# Patient Record
Sex: MALE | Race: OTHER | Employment: PART TIME | URBAN - METROPOLITAN AREA
[De-identification: names, ages, dates, MRNs, and addresses within clinical notes are randomized per-mention and may not be internally consistent; named-entity substitution may affect disease eponyms.]

---

## 2021-03-06 ENCOUNTER — APPOINTMENT (OUTPATIENT)
Dept: GENERAL RADIOLOGY | Age: 21
End: 2021-03-06
Payer: COMMERCIAL

## 2021-03-06 ENCOUNTER — HOSPITAL ENCOUNTER (EMERGENCY)
Age: 21
Discharge: HOME | End: 2021-03-06
Attending: EMERGENCY MEDICINE
Payer: COMMERCIAL

## 2021-03-06 VITALS
WEIGHT: 165 LBS | RESPIRATION RATE: 16 BRPM | OXYGEN SATURATION: 98 % | HEART RATE: 70 BPM | HEIGHT: 70 IN | SYSTOLIC BLOOD PRESSURE: 119 MMHG | TEMPERATURE: 98.1 F | DIASTOLIC BLOOD PRESSURE: 70 MMHG | BODY MASS INDEX: 23.62 KG/M2

## 2021-03-06 DIAGNOSIS — S63.005A DISLOCATION OF LEFT WRIST, INITIAL ENCOUNTER: Primary | ICD-10-CM

## 2021-03-06 PROCEDURE — 96372 THER/PROPH/DIAG INJ SC/IM: CPT

## 2021-03-06 PROCEDURE — 25675 CLTX DSTL RAD/ULN DISLC MNPJ: CPT

## 2021-03-06 PROCEDURE — 99283 EMERGENCY DEPT VISIT LOW MDM: CPT

## 2021-03-06 PROCEDURE — 73110 X-RAY EXAM OF WRIST: CPT

## 2021-03-06 PROCEDURE — 6370000000 HC RX 637 (ALT 250 FOR IP): Performed by: EMERGENCY MEDICINE

## 2021-03-06 PROCEDURE — 73100 X-RAY EXAM OF WRIST: CPT

## 2021-03-06 PROCEDURE — 6360000002 HC RX W HCPCS: Performed by: EMERGENCY MEDICINE

## 2021-03-06 RX ORDER — IBUPROFEN 800 MG/1
800 TABLET ORAL ONCE
Status: DISCONTINUED | OUTPATIENT
Start: 2021-03-06 | End: 2021-03-06

## 2021-03-06 RX ORDER — MELOXICAM 15 MG/1
15 TABLET ORAL DAILY PRN
Qty: 90 TABLET | Refills: 1 | Status: SHIPPED | OUTPATIENT
Start: 2021-03-06

## 2021-03-06 RX ORDER — KETOROLAC TROMETHAMINE 30 MG/ML
30 INJECTION, SOLUTION INTRAMUSCULAR; INTRAVENOUS ONCE
Status: COMPLETED | OUTPATIENT
Start: 2021-03-06 | End: 2021-03-06

## 2021-03-06 RX ORDER — OXYCODONE HYDROCHLORIDE AND ACETAMINOPHEN 5; 325 MG/1; MG/1
1 TABLET ORAL ONCE
Status: COMPLETED | OUTPATIENT
Start: 2021-03-06 | End: 2021-03-06

## 2021-03-06 RX ADMIN — OXYCODONE HYDROCHLORIDE AND ACETAMINOPHEN 1 TABLET: 5; 325 TABLET ORAL at 08:36

## 2021-03-06 RX ADMIN — KETOROLAC TROMETHAMINE 30 MG: 30 INJECTION, SOLUTION INTRAMUSCULAR; INTRAVENOUS at 08:37

## 2021-03-06 SDOH — HEALTH STABILITY: MENTAL HEALTH: HOW OFTEN DO YOU HAVE A DRINK CONTAINING ALCOHOL?: NEVER

## 2021-03-06 ASSESSMENT — PAIN SCALES - GENERAL
PAINLEVEL_OUTOF10: 7
PAINLEVEL_OUTOF10: 7

## 2021-03-06 ASSESSMENT — ENCOUNTER SYMPTOMS
VOMITING: 0
ABDOMINAL PAIN: 0
COUGH: 0
BACK PAIN: 0
NAUSEA: 0
DIARRHEA: 0
SHORTNESS OF BREATH: 0
SORE THROAT: 0

## 2021-03-06 ASSESSMENT — PAIN DESCRIPTION - PAIN TYPE: TYPE: ACUTE PAIN

## 2021-03-06 ASSESSMENT — PAIN DESCRIPTION - ORIENTATION: ORIENTATION: LEFT

## 2021-03-06 NOTE — ED TRIAGE NOTES
Arrived to the ER for Left wrist pain. States wrist began hurting approx 2 weeks ago. No direct injury that can remember. Today pain in wrist increased in intensity and felt tender to touch with swelling. Full ROM of wrist and fingers. Denies numbness/ tingling, +cap refill.

## 2021-03-06 NOTE — ED PROVIDER NOTES
[address] [facility] ED  eMERGENCYdEPARTMENT eNCOUnter      Pt Name: Jarrett Mendoza  MRN: [id number]  Emeliagfpedro 2000  Date of evaluation: 3/6/2021  Russell Mitchell MD    CHIEF COMPLAINT           HPI  Jarrett Mendoza is a 24 y.o. male per chart review has no pmh presents to the ED with L wrist pain. Pt notes gradual onset, moderate, constant, L wrist pain x 2 weeks. Pt is L handed. Pt works at [employer] and unsure if he hurt it at work. Pt denies fever, n/v, cp, sob, dysuria, diarrhea. ROS  Review of Systems   Constitutional: Negative for activity change, chills and fever. HENT: Negative for ear pain and sore throat. Eyes: Negative for visual disturbance. Respiratory: Negative for cough and shortness of breath. Cardiovascular: Negative for chest pain, palpitations and leg swelling. Gastrointestinal: Negative for abdominal pain, diarrhea, nausea and vomiting. Genitourinary: Negative for dysuria. Musculoskeletal: Negative for back pain. L wrist pain   Skin: Negative for rash. Neurological: Negative for dizziness and weakness. Except as noted above the remainder of the review of systems was reviewed and negative. PAST MEDICAL HISTORY   History reviewed. No pertinent past medical history. SURGICAL HISTORY     History reviewed. No pertinent surgical history. CURRENTMEDICATIONS       Previous Medications    No medications on file       ALLERGIES     Patient has no known allergies. FAMILY HISTORY     History reviewed. No pertinent family history.        SOCIAL HISTORY       Social History     Socioeconomic History    Marital status: Single     Spouse name: None    Number of children: None    Years of education: None    Highest education level: None   Occupational History    None   Social Needs    Financial resource strain: None    Food insecurity     Worry: None     Inability: None    Transportation needs     Medical: None     Non-medical: None dry.   Neurological:      Mental Status: He is alert and oriented to person, place, and time. Psychiatric:         Mood and Affect: Mood normal.           MDM  23 yo male presents to the ED with L wrist pain. Pt is afebrile, hemodynamically stable. Pt given PO percocet, IM toradol in the ED. XR of wrist shows ulnar dislocation at the wrist with lateral displacement. 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 Pt also lifts a lot of boxes at work. Pt's wrist was reduced in the ED. Post reduction wrist XR normal.  Pt placed in wrist splint. Pt given prescription for mobic, given wrist pain warning signs and will f/u with ortho. Pt understands plan. Conscious Sedation Procedure Note    Indication: L wrist dislocation    Consent: I have discussed with the patient and/or the patient representative the indication, alternatives, and the possible risks and/or complications of the planned procedure and the anesthesia methods. The patient and/or patient representative appear to understand and agree to proceed. Physician Involvement: The attending physician was present and supervising this procedure. Pre-Sedation Documentation and Exam: I have personally completed a history, physical exam & review of systems for this patient (see notes). Airway Assessment: normal    Prior History of Anesthesia Complications: none    ASA Classification: Class 1 - A normal healthy patient    Sedation/ Anesthesia Plan: PO percocet, IM toradol    Medications Used: PO percocet, IM toradol    Monitoring and Safety: The patient was placed on a cardiac monitor and vital signs, pulse oximetry and level of consciousness were continuously evaluated throughout the procedure. The patient was closely monitored until recovery from the medications was complete and the patient had returned to baseline status.  Respiratory therapy was on standby at all times during the procedure. (The following sections must be completed)  Post-Sedation Vital Signs: Vital signs were reviewed and were stable after the procedure (see flow sheet for vitals)            Post-Sedation Exam: Lungs: clear to auscultation bilaterally without crackles or wheezing and Cardiovascular: regular rate and rhythm, no murmurs rubs or gallops           Complications: none              FINAL IMPRESSION      1.  Dislocation of left wrist, initial encounter          DISPOSITION/PLAN   DISPOSITION Decision To Discharge 03/06/2021 09:34:10 AM        DISCHARGE MEDICATIONS:  [unfilled]         Angela Zapata MD(electronically signed)  Attending Emergency Physician            Angela Zapata MD  03/06/21 [address] [facility] MD Grace  03/06/21 2027

## 2021-03-11 ENCOUNTER — OFFICE VISIT (OUTPATIENT)
Dept: ORTHOPEDIC SURGERY | Age: 21
End: 2021-03-11
Payer: COMMERCIAL

## 2021-03-11 ENCOUNTER — HOSPITAL ENCOUNTER (OUTPATIENT)
Dept: ORTHOPEDIC SURGERY | Age: 21
Discharge: HOME | End: 2021-03-13
Payer: COMMERCIAL

## 2021-03-11 VITALS
WEIGHT: 165 LBS | OXYGEN SATURATION: 97 % | BODY MASS INDEX: 23.1 KG/M2 | HEIGHT: 71 IN | HEART RATE: 96 BPM | TEMPERATURE: 97.2 F

## 2021-03-11 DIAGNOSIS — M25.9 WRIST CLICKING: Primary | ICD-10-CM

## 2021-03-11 DIAGNOSIS — M25.9 WRIST CLICKING: ICD-10-CM

## 2021-03-11 DIAGNOSIS — M25.532 WRIST PAIN, CHRONIC, LEFT: ICD-10-CM

## 2021-03-11 DIAGNOSIS — G89.29 WRIST PAIN, CHRONIC, LEFT: ICD-10-CM

## 2021-03-11 PROCEDURE — 73110 X-RAY EXAM OF WRIST: CPT

## 2021-03-11 PROCEDURE — L3908 WHO COCK-UP NONMOLDE PRE OTS: HCPCS | Performed by: ORTHOPAEDIC SURGERY

## 2021-03-11 PROCEDURE — 99204 OFFICE O/P NEW MOD 45 MIN: CPT | Performed by: ORTHOPAEDIC SURGERY

## 2021-03-11 PROCEDURE — 73110 X-RAY EXAM OF WRIST: CPT | Performed by: ORTHOPAEDIC SURGERY

## 2021-03-11 ASSESSMENT — ENCOUNTER SYMPTOMS
SHORTNESS OF BREATH: 0
ABDOMINAL PAIN: 0
SORE THROAT: 0

## 2021-03-11 NOTE — PROGRESS NOTES
Subjective:      Patient ID: Jaguar Grijalva is a 24 y.o. male who presents today for:  Chief Complaint   Patient presents with    Wrist Injury     disloaction left wrist. He feels it may be an old injury that is now bothering him. Xray done 03/06/21. Pain 3-4/10 He is taking meloxicam        HPI  He was in for evaluation of left wrist pain. Apparently this began approximately 2 weeks ago. Which seen in the emergency room was diagnosed as having dislocation of his distal radial ulnar joint. And apparently had a reduction. Reviews of the x-rays and the ER chart were performed. His complaint this point is of a dull ache in the wrist area. Denies any swelling denies any numbness denies any tingling. No past medical history on file. No past surgical history on file.   Social History     Socioeconomic History    Marital status: Single     Spouse name: Not on file    Number of children: Not on file    Years of education: Not on file    Highest education level: Not on file   Occupational History    Not on file   Social Needs    Financial resource strain: Not on file    Food insecurity     Worry: Not on file     Inability: Not on file    Transportation needs     Medical: Not on file     Non-medical: Not on file   Tobacco Use    Smoking status: Never Smoker    Smokeless tobacco: Never Used   Substance and Sexual Activity    Alcohol use: Never     Frequency: Never    Drug use: Never    Sexual activity: Not on file   Lifestyle    Physical activity     Days per week: Not on file     Minutes per session: Not on file    Stress: Not on file   Relationships    Social connections     Talks on phone: Not on file     Gets together: Not on file     Attends Protestant service: Not on file     Active member of club or organization: Not on file     Attends meetings of clubs or organizations: Not on file     Relationship status: Not on file    Intimate partner violence     Fear of current or ex partner: Not on file Emotionally abused: Not on file     Physically abused: Not on file     Forced sexual activity: Not on file   Other Topics Concern    Not on file   Social History Narrative    Not on file     No family history on file. No Known Allergies  Current Outpatient Medications on File Prior to Visit   Medication Sig Dispense Refill    meloxicam (MOBIC) 15 MG tablet Take 1 tablet by mouth daily as needed for Pain 90 tablet 1     No current facility-administered medications on file prior to visit. Review of Systems   Constitutional: Negative for fever. HENT: Negative for sore throat. Eyes: Negative for visual disturbance. Respiratory: Negative for shortness of breath. Cardiovascular: Negative for chest pain. Gastrointestinal: Negative for abdominal pain. Genitourinary: Negative for difficulty urinating. Skin: Negative for rash. Neurological: Negative for headaches. Hematological: Does not bruise/bleed easily. Objective:   Pulse 96   Temp 97.2 °F (36.2 °C) (Temporal)   Ht 5' 11\" (1.803 m)   Wt 165 lb (74.8 kg)   SpO2 97%   BMI 23.01 kg/m²     Ortho Exam  Left wrist shows no effusion no erythema no ecchymosis. There is full range of motion MCP, PIP, DIP of all digits of the left hand. There is flexion extension of the wrist flexion from 0 to approximately 60 degrees extension from 0 to approximately 80 degrees. Radial and ulnar deviation from 0 to proximally 30 degrees. There is discomfort at the distal radius ulnar joint primarily involving the ulnar styloid region in the region of the TFCC. There is a +2 pulse. Good capillary refill of all digits of the hand. Normal sensation in the radial and ulnar aspect of all digits. He can fully pronate and supinate with minimal discomfort distal radial ulnar joint. Radiographs and Laboratory Studies:     Diagnostic Imaging Studies:     We have x-rays he had performed in the emergency room shows a slight widening of the distal radial ulnar joint. No carpal dislocations are noted. No fractures or dislocations seen otherwise. At this point we went ahead obtain a comparison view of his right wrist and see if this is his normal alignment or if this is a nonacute injury to the distal radial ulnar joint. Xr Wrist Right (min 3 Views)    Result Date: 3/11/2021  She is AP lateral Bleich that we obtained for comparison view to the opposing side shows a distal radial ulnar space of approximately 5.1 mm. When compared to the other side it appears to be roughly the same distance. Laboratory Studies:   No results found for: WBC, HGB, HCT, MCV, PLT  No results found for: SEDRATE  No results found for: CRP    Assessment:      Diagnosis Orders   1. Wrist clicking  XR WRIST RIGHT (MIN 3 VIEWS)    MRI WRIST LEFT WO CONTRAST   2. Wrist pain, chronic, left            Plan:     Impression sprain distal radial ulnar joint left wrist possible nonacute subluxation-dislocation from prior injury at this point we will go ahead and obtain an MRI evaluation to further evaluate the distal radial ulnar joint and the TFCC. We will place him in a splint in the meantime ibuprofen for discomfort. We will see him back after the MRI. Orders Placed This Encounter   Procedures    XR WRIST RIGHT (MIN 3 VIEWS)     Standing Status:   Future     Number of Occurrences:   1     Standing Expiration Date:   3/11/2022    MRI WRIST LEFT WO CONTRAST     Standing Status:   Future     Standing Expiration Date:   4/11/2021     No orders of the defined types were placed in this encounter. No follow-ups on file.       You Payne MD

## 2021-04-01 ENCOUNTER — HOSPITAL ENCOUNTER (OUTPATIENT)
Dept: MRI IMAGING | Age: 21
Discharge: HOME | End: 2021-04-03
Payer: COMMERCIAL

## 2021-04-01 DIAGNOSIS — M25.9 WRIST CLICKING: ICD-10-CM

## 2021-04-01 PROCEDURE — 73221 MRI JOINT UPR EXTREM W/O DYE: CPT

## 2021-04-14 ENCOUNTER — OFFICE VISIT (OUTPATIENT)
Dept: ORTHOPEDIC SURGERY | Age: 21
End: 2021-04-14
Payer: COMMERCIAL

## 2021-04-14 VITALS
HEIGHT: 71 IN | WEIGHT: 165 LBS | OXYGEN SATURATION: 99 % | HEART RATE: 72 BPM | TEMPERATURE: 97.6 F | BODY MASS INDEX: 23.1 KG/M2

## 2021-04-14 DIAGNOSIS — S60.212D CONTUSION OF LEFT WRIST, SUBSEQUENT ENCOUNTER: ICD-10-CM

## 2021-04-14 DIAGNOSIS — S63.502S WRIST SPRAIN, LEFT, SEQUELA: ICD-10-CM

## 2021-04-14 PROBLEM — S60.212A CONTUSION OF LEFT WRIST: Status: ACTIVE | Noted: 2021-04-14

## 2021-04-14 PROCEDURE — 99214 OFFICE O/P EST MOD 30 MIN: CPT | Performed by: ORTHOPAEDIC SURGERY

## 2021-04-14 NOTE — PROGRESS NOTES
Subjective:      Patient ID: Juan Moe is a 24 y.o. male who presents today for:  Chief Complaint   Patient presents with    Follow-up     go over MRI which was done on 04/01/21 of left wrist. He has o taken the Mobic in a week. Pain is still off and on. HPI  Patient comes in for follow-up of his left wrist.  Still having some discomfort. Pain is on and off in nature now. Wearing his brace. And taking the Mobic. No past medical history on file. No past surgical history on file. Social History     Socioeconomic History    Marital status: Single     Spouse name: Not on file    Number of children: Not on file    Years of education: Not on file    Highest education level: Not on file   Occupational History    Not on file   Social Needs    Financial resource strain: Not on file    Food insecurity     Worry: Not on file     Inability: Not on file    Transportation needs     Medical: Not on file     Non-medical: Not on file   Tobacco Use    Smoking status: Never Smoker    Smokeless tobacco: Never Used   Substance and Sexual Activity    Alcohol use: Never     Frequency: Never    Drug use: Never    Sexual activity: Not on file   Lifestyle    Physical activity     Days per week: Not on file     Minutes per session: Not on file    Stress: Not on file   Relationships    Social connections     Talks on phone: Not on file     Gets together: Not on file     Attends Episcopal service: Not on file     Active member of club or organization: Not on file     Attends meetings of clubs or organizations: Not on file     Relationship status: Not on file    Intimate partner violence     Fear of current or ex partner: Not on file     Emotionally abused: Not on file     Physically abused: Not on file     Forced sexual activity: Not on file   Other Topics Concern    Not on file   Social History Narrative    Not on file     No family history on file.   No Known Allergies  Current Outpatient Medications on File Prior to Visit   Medication Sig Dispense Refill    meloxicam (MOBIC) 15 MG tablet Take 1 tablet by mouth daily as needed for Pain (Patient not taking: Reported on 4/14/2021) 90 tablet 1     No current facility-administered medications on file prior to visit. Review of Systems  No change from prior visit. Objective:   Pulse 72   Temp 97.6 °F (36.4 °C) (Temporal)   Ht 5' 11\" (1.803 m)   Wt 165 lb (74.8 kg)   SpO2 99%   BMI 23.01 kg/m²     Ortho Exam  No significant difference from last visit. Pain primarily in the ulnar aspect of his wrist.  Neurological vas status is intact. Radiographs and Laboratory Studies:     Diagnostic Imaging Studies:    You have the MRI he had performed does not show any fractures or dislocations. There does appear to be some increase edema in the ulnar styloid region. There appears to be some slight irregularity in the TFCC but no jake tear is seen. Some sprain of the ligaments are noted. Laboratory Studies:   No results found for: WBC, HGB, HCT, MCV, PLT  No results found for: SEDRATE  No results found for: CRP    Assessment:      Diagnosis Orders   1. Wrist sprain, left, sequela     2. Contusion of left wrist, subsequent encounter            Plan: At this time I discussed the problem with the patient we will go ahead and continue with anti-inflammatories. We will start him on occupational therapy to see if we get his motion to improve without pain. We will see him back for follow-up in 4 to 6 weeks. He can use his brace if he is having discomfort. Any problems or difficulties prior to that she will notify us. At this point I do not see any surgical lesion in the MRI. We will see how he responds with conservative measures    No orders of the defined types were placed in this encounter. No orders of the defined types were placed in this encounter. No follow-ups on file.       Tess Hill MD

## 2021-04-21 ENCOUNTER — HOSPITAL ENCOUNTER (OUTPATIENT)
Dept: OCCUPATIONAL THERAPY | Age: 21
Setting detail: THERAPIES SERIES
Discharge: HOME | End: 2021-04-21
Payer: COMMERCIAL

## 2021-04-21 PROCEDURE — 97165 OT EVAL LOW COMPLEX 30 MIN: CPT

## 2021-04-21 NOTE — PROGRESS NOTES
[facility]:       [address], [address] [facility]  Ph: [phone]  Fax: [phone]    OCCUPATIONAL THERAPY EVALUATION     Evaluation Date:  4/21/2021       OT Individual Minutes  Time In: 1410  Time Out: 1500  Minutes: 50    OT Eval low complexity 50 minutes for 1 unit, CPT 68683     Patient Name:Wellington Bhardwaj   Gender: male   YOB: 2000         MRN: [id number]     Physician: Referring Practitioner: Dr. Sammie Joe  Diagnosis: Diagnosis: L wrist sprain, contusion   Treating diagnosis: R29.898 Other symptoms and signs involving the musculoskeletal systems (decreased ROM and pain)                 Referral Date:  4/14/2021          Onset Date: Onset Date: 04/14/21    PMH:  Patient Active Problem List   Diagnosis    Wrist sprain, left, sequela    Contusion of left wrist     No past medical history on file. No past surgical history on file. No Known Allergies    Diagnostic imaging: Physician interpretation of imaging tests reviewed. Medications:    Current Outpatient Medications:     meloxicam (MOBIC) 15 MG tablet, Take 1 tablet by mouth daily as needed for Pain (Patient not taking: Reported on 4/14/2021), Disp: 90 tablet, Rfl: 1    Visits Allowed/Insurance/Certification Information:   OT Visit Information  Onset Date: 04/14/21  OT Insurance Information: Tosha Mckenna  Allowed visits:  unlimited  Progress Note Counter: 1    Restrictions/Precautions wearing brace LUE at work.          English primary language: Yes    Transfer of pt care required: no, Pt able to work with [address] contact: mother: Trina Alcantar       Pt lives: mother  Home:  split level 1 steps up and several steps down   Entrance: level entry   Bathroom: tub/shower combo   DME: None     Pain:                 Pain Location  Description Initial Rating  Current Rating  Improved by Worsened by   ulnar L wrist burning pain na 5/10 rest, splint motion toward ulnar side   Max pain at home during eval.    Palpation/Tenderness  No tenderness with palpation    Education/Barriers to learning:     Barriers:none   Education on this date: OT role and POC     ASSESSMENT    Pt is a 24 y.o. male     Problems:  [] Decreased UE strength   [] Decreased UE ROM   [x] Decreased  strength   [] Decreased fine motor skills   [x] Increased pain    [] Decreased ADL status   [] Decreased joint mobility   [] Decreased coordination   [] Decreased sensation    [x] Other: Decreased wrist stability. Complexity:         [x] Low Complexity:   ¨ History: Brief history including review of medical records relating to the problem  ¨ Exam: 1-3 performance Deficits  ¨ Assistance/Modification: No assistance or modifications required to perform tasks. No comorbities affecting occupational performance  [] Medium Complexity:   ¨ History: Expanded review of medical records and additional review of physical, cognitive, or psychosocial history related to current functional performance  ¨ Exam: 3-5 performance deficits  ¨ Assistance/Modification: Min/mod assistance or modifications required to perform tasks. May have comorbidities that affect occupational performance. [] High Complexity:   ¨ History: Extensive review of medical records and additional review of physical, cognitive, or psychosocial history related to current functional performance. ¨ Exam: 5 or more performance deficits  ¨ Assistance/Modification: Significant assistance or modifications required to perform tasks. Have comorbidities that affect occupational performance. The Upper Extremity Functional Index  The Upper Extremity Functional Scale  Any of your usual work, housework, or school activities: No Difficulty  Your usual hobbies, recreational, or sporting activities: A Little Bit of Difficulty  Lifting a bag of groceries to waist level: No Difficulty  Lifting a bag of groceries above your head:  A Little Bit of Difficulty  Grooming your hair: No Difficulty  Pushing up on your hands (eg from bathtub/chair): A Little Bit of Difficulty  Preparing food (eg peeling, cutting): No Difficulty  Driving: A Little Bit of Difficulty  Vacuuming, sweeping, or raking: No Difficulty  Dressing: No Difficulty  Doing up buttons: No Difficulty  Using tools or appliances: A Little Bit of Difficulty  Opening doors: No Difficulty  Cleaning: No Difficulty  Tying or lacing shoes: No Difficulty  Sleeping: No Difficulty  Laundering clothes (eg washing, ironing, folding) : No Difficulty  Opening a jar: A Little Bit of Difficulty  Throwing a ball: Moderate Difficulty  Carrying a small suitcase with your affected limb: A Little Bit of Difficulty  UEFS Score: 88.75      Rehabilitation Potential:    [x] Excellent [] Good  [] Fair  [] Poor      PLAN OF CARE     To see patient for 2 x/week for 5 weeks or 10 visits for the following treatment interventions:    [x] Evaluate & Treat [] Neuromuscular Re-education:     [x] Re-evaluation [] Tissue (stress) Loading Program    [x] Pain Management  [x] PROM/Stretching/AAROM/AROM    [] Edema Management  [] Splinting    [] Wound Care/Scar Management  [] Desensitization    [] ADL Training  [x] Strengthening/Graded Therapeutic Activity    [] Tendon Repair Program  [] Coordination/Dexterity Training    [x] Instruction/Application of energy [] Manual Techniques        conservation, work simplification [x] Instruction in HEP        joint protection, body mechanics [] Aquatic Therapy    [x] Modalities [x]  Ultrasound           [] Infrared [] Hot/Cold Pack:          [] Paraffin   [] Other:   [] Electrical Stimulation [x] Fluidotherapy     [x] TRAN able to work with pt   [x] D/C plan: Will assess pt after established visits to determine need for continued therapy. GOALS:     LTG 1 : Patient will report pain 1-2 or less during functional activities. LTG 2 : Patient  will be Indep. with all recommended HEP's, adaptive strategies, and adaptive techniques.     LTG 3 : Patient  will increase Left  strength from current by 10 lbs to increase performance with I/ADL's. LTG 4 : Patient  will increase Left pinch strength from current by 2-3 lbs to increase performance with I/ADL's. LTG 5 : Patient will be IND with ECWS techniques and improve stability of Left wrist  for ADL. LTG 6 : Pt. will utilize proper positioning and lifting techniques to perform work tasks without pain. JUSTINE Vernon/L 4/21/2021 4:23 PM    Falls Risk Assessment     Age: 0-59 = 0          60-69= 1            > 70= 2 History of Falls:   0  Falls  last 6 mo = 0    1  fall  Last  6 mo = 1   1-3 falls last 6 mo = 2 Medical History:   Parkinsons, CVA,HTN, vertigo, >4 meds, use of assistive device (1pt.for each)  Mental Status:  A & O x 3 = 0  Disoriented to person, place, or time = 2     [x]  INITIAL ASSESSMENT:                                                      Date: 4/21/2021                                                  Age:   1                                                         Falls: 0                                                          PMH: 0                                                          Mental: 0                                                       Total:  0                                                        *Patient 4 or younger:   Vestibular:     Signature: JUSTINE Vernon/JETHRO                                                      High Risk for Falls: >8  Intermediate Risk for Falls: 4-8   Low Risk for Falls: <4    * All pediatric patients (age 3 or younger) and vestibular patients will be considered high risk for falls- scoring does not need to be completed - treat as high risk. Interventions     Low Risk: Monitor for changes, reassess every three months. Intermediate Risk: Supervision for most activities, direct contact with new activities or equipment, reassess monthly. High Risk: Stand by assitance at all times, reassess monthly.      The following patient has been evaluated for occupational therapy services. For therapy to continue, insurance requires physician review of the treatment plan. Please review the attached evaluation and/or summary of the patient's plan of care, and verify that you agree therapy should continue by signing the attached document and sending it back to our office.  Thank you for this referral.    Physician signature____________________________________     Date________________

## 2021-04-26 ENCOUNTER — HOSPITAL ENCOUNTER (OUTPATIENT)
Dept: OCCUPATIONAL THERAPY | Age: 21
Setting detail: THERAPIES SERIES
Discharge: HOME | End: 2021-04-26
Payer: COMMERCIAL

## 2021-04-26 PROCEDURE — 97110 THERAPEUTIC EXERCISES: CPT

## 2021-04-26 NOTE — PROGRESS NOTES
Occupational Therapy  Daily Note     Name: David Pillai  : 2000  MRN: [id number]  Diagnosis: L wrist sprain, contusion    Visit Information:   Onset Date: 21  OT Insurance Information: BCBS  Progress Note Counter: 2    Date: 2021  Time:   OT Therapeutic exercises 45 minutes for 3 unit(s), CPT 56361       OT Individual Minutes  Time In: 2779  Time Out: 1500  Minutes: 50    Referring Practitioner: Dr. Nina Jaffe       Subjective:   Pt. into dept. at 2:12 pm., late for appt. \"I looked at my appt. time wrong,\" pt. stated. Pain rating:   Pre-treatment pain:    Pt denies pain    Action for pain:   No action necessary. Pain after treatment:      3/10- 4/10 ulnar left wrist       Focus of treatment was on the following:   stabilizing L wrist     Objective:    Treatment Activity:   Modality:     Patient was screened for contraindications prior to use of modality. Fluidotherapy at 115° F for 15 minutes while squeezing foam sponge and performing wrist motions LUE. Pt. participated in wrist stabilization exercises. Pt. completed 2 minutes on UBE at level 2 focusing on wrist motions. Medium-light theratubing utilized for pt. to complete pull backs, radial-ulnar deviation and pull downs with wrist neutral LUE x 2 sets x 10. Using BOING, pt. performed oscillating motions of side to side and light forward/back with elbow at 90 with report of \"pulling feeling. \"    Performed 90 sec ice massage ulnar side of wrist at end of session. Education/HEP: body mechanics: positioning of LUE with all lifting tasks. Discussed previous HEP: n/a    Comments:     Assessment:   Pt tolerated treatment well. Mild increase in discomfort ulnar side of wrist with activity this date, thus provided ice at end of session. Pt. demonstrates good understanding of positioning recommendations.     Plan:   Continue POC    Goals:     Long term goals  Long term goal 1: Patient will report pain 1-2 or less during functional activities  Long term goal 2: Patient  will be Indep. with all recommended HEP's, adaptive strategies, and adaptive techniques. Long term goal 3: Patient  will increase Left  strength from current by 10 lbs to increase performance with I/ADL's. Long term goal 4: Patient  will increase Left pinch strength from current by 2-3 lbs to increase performance with I/ADL's. Long term goal 5: Patient will be IND with ECWS techniques and improve stability of Left wrist  for ADL. Long term goals 6: Pt. will utilize proper positioning and lifting techniques to perform work tasks without pain.     Mack Crook, OTR/L   4/26/2021  4:08 PM

## 2021-04-29 ENCOUNTER — HOSPITAL ENCOUNTER (OUTPATIENT)
Dept: OCCUPATIONAL THERAPY | Age: 21
Setting detail: THERAPIES SERIES
Discharge: HOME | End: 2021-04-29
Payer: COMMERCIAL

## 2021-04-29 PROCEDURE — 97530 THERAPEUTIC ACTIVITIES: CPT

## 2021-04-29 PROCEDURE — 97110 THERAPEUTIC EXERCISES: CPT

## 2021-04-29 NOTE — PROGRESS NOTES
Occupational Therapy  Daily Note     Name: Krista Carter  : 2000  MRN: [id number]  Diagnosis: L wrist sprain, contusion    Visit Information:   Onset Date: 21  OT Insurance Information: BCBS  Progress Note Counter: 3    Date: 2021  Time:   OT Therapeutic activities 24 minutes for 2 unit(s), CPT 77448  OT Therapeutic exercises 33 minutes for 2 unit(s), CPT 54563       OT Individual Minutes  Time In: 1500  Time Out: 1600  Minutes: 61    Referring Practitioner: Dr. Graves Cast       Subjective:     \"I had pain in the ulnar side of my wrist this morning that lasted about 20 minutes,\" pt. stated. Pain rating:   Pre-treatment pain:    Pt denies pain  Pt. experiences occasional pain    Action for pain:   No action necessary. Pain after treatment:      Pt denies pain, however experienced occasional pain during treatment. Focus of treatment was on the following:   Increasing strength and stability of L wrist.     Objective:    Treatment Activity:     Modality:     Fluidotherapy at 115° F for 20 minutes while gripping sponge ball and performing wrist AROM LUE. Pt. participated in therapeutic activities using UBE at level 2 for 2 min forward and 2 min. reverse, oscillating motions with LUE using large body blade with elbow slightly flexed and stabilizing large 5 LB. block of putty with LUE while pulling with RUE into smaller sections. Pt. required brief rest breaks. Exercises:  Pt completed 10 reps with LUE for left  wrist stability performing pull downs and horizontal abduction with red theraband, sup/pronation with 1/2 lb. hammer and isometric contraction exercises applying pressure with LUE against therapy ball. Education/HEP: Educated patient on wrist positioning during lifting to prevent injury. Pt. demo good understanding. Discussed previous HEP: n/a     Comments:     Assessment:   Pt tolerated treatment well. Pt. utilizing wrist brace for work tasks only.   Pt. continues to experience occasional pain ulnar side of wrist during activities, however pain does not persist.  Pt. reports fatigue LUE at end of session. Plan:   Continue POC    Goals:     Long term goals  Long term goal 1: Patient will report pain 1-2 or less during functional activities  Long term goal 2: Patient  will be Indep. with all recommended HEP's, adaptive strategies, and adaptive techniques. Long term goal 3: Patient  will increase Left  strength from current by 10 lbs to increase performance with I/ADL's. Long term goal 4: Patient  will increase Left pinch strength from current by 2-3 lbs to increase performance with I/ADL's. Long term goal 5: Patient will be IND with ECWS techniques and improve stability of Left wrist  for ADL. Long term goals 6: Pt. will utilize proper positioning and lifting techniques to perform work tasks without pain.     JAMIL Mendoza   4/29/2021  4:55 PM

## 2021-05-03 ENCOUNTER — HOSPITAL ENCOUNTER (OUTPATIENT)
Dept: OCCUPATIONAL THERAPY | Age: 21
Setting detail: THERAPIES SERIES
Discharge: HOME | End: 2021-05-03
Payer: COMMERCIAL

## 2021-05-03 PROCEDURE — 97110 THERAPEUTIC EXERCISES: CPT

## 2021-05-03 PROCEDURE — 97530 THERAPEUTIC ACTIVITIES: CPT

## 2021-05-10 ENCOUNTER — HOSPITAL ENCOUNTER (OUTPATIENT)
Dept: OCCUPATIONAL THERAPY | Age: 21
Setting detail: THERAPIES SERIES
Discharge: HOME | End: 2021-05-10
Payer: COMMERCIAL

## 2021-05-10 PROCEDURE — 97530 THERAPEUTIC ACTIVITIES: CPT

## 2021-05-10 NOTE — PROGRESS NOTES
Occupational Therapy  Daily Note     Name: Ajit Albert  : 2000  MRN: [id number]  Diagnosis: L wrist sprain, contusion    Visit Information:   Onset Date: 21  OT Insurance Information: BCBS  Progress Note Counter: 5    Date: 5/10/2021  Time:   OT Therapeutic activities 58 minutes for 4 unit(s), CPT 10975       OT Individual Minutes  Time In: 1400  Time Out: 1500  Minutes: 61    Referring Practitioner: Dr. Cathryn aTy    Subjective:     \"My wrist is doing so much better,\" pt. stated. Pain rating:   Pre-treatment pain:    Pt denies pain. Pt. reports, \"Occasional ache and stiffness in morning only. \"    Action for pain:   No action necessary. Pain after treatment:      Pt denies pain         Focus of treatment was on the following:   assess for progress toward goals     Objective:    Treatment Activity:    Wrist stabilization activities using BOING oscillation back and forth and side to side with LUE. Pt. performed repetitive  with moderate resistance LUE x 15 reps. Weight bearing through [address] [place] [address], pt. performed modified wall push ups and walk outs with UEs. No pain reported. Pt. gripped and pulled with LUE against blue firm resistance power web x 15 reps. Wrist PREs with 2 lb. weight for 3 ex. x 15 reps LUE. .  Pt. performed sup/pro, flex/ext and radial/ulnar dev. Re-measured LUE:     EDEMA: Circumferential  Measurements cm              LEFT                LEFT(INITIAL)   Wrist across styloid 17.5 17.9        1 trial setting #2   LEFT  CURRENT      LEFT        Eval     Left     Norm       Tha lb. 112 89 116   PALMAR  Pinch  Lb. 19 12 21   LATERAL  Pinch  Lb. 25 12 21.5     Education/HEP: Scapula and wrist stability exercises with swiss ball and wall tasks. Discussed previous HEP: yes, Pt verbally confirmed compliant with HEP's    Comments:     Assessment:   Pt tolerated treatment well. Noted pt. demo.  significant winging of BUE scapulas, thus provided wt. bearing activities for wrist and shoulder stability. Pt. reports no pain past week during functional activities. Progressing well. Plan:   Continue POC    Goals:     Long term goals  Long term goal 1: Patient will report pain 1-2 or less during functional activities  Long term goal 2: Patient  will be Indep. with all recommended HEP's, adaptive strategies, and adaptive techniques. Long term goal 3: Patient  will increase Left  strength from current by 10 lbs to increase performance with I/ADL's. Long term goal 4: Patient  will increase Left pinch strength from current by 2-3 lbs to increase performance with I/ADL's. Long term goal 5: Patient will be IND with ECWS techniques and improve stability of Left wrist  for ADL. Long term goals 6: Pt. will utilize proper positioning and lifting techniques to perform work tasks without pain.     Murtaza Torres, OTR/L   5/10/2021  3:45 PM

## 2021-05-11 NOTE — PROGRESS NOTES
OCCUPATIONAL THERAPY PLAN OF CARE     [facility]   [address], [address] [place]  Phone: [phone]    [] Certification     [] Recertification     [] Plan of Care    [x] Progress Note        Date: 2021    To:Referring Practitioner: Dr. Sherin Lau          From: Rayne Euceda, OTR/L  Patient: Arden Williamson       : 2000  MRN: [id number]  Diagnosis:Diagnosis: L wrist sprain, contusion   Date of eval: 2021    Visit Information:   Onset Date: 21  OT Insurance Information: BCBS  Progress Note Counter: 5    Last POC date: 2021   Reporting period: 2021-5/10/2021                            Assessment:    Goals Current/Discharge status  Met   Long term goal 1: Patient will report pain 1-2 or less during functional activities Pt. only experiences occasional pain ulnar side of L wrist during ADL. Pain does not persist. [] Met  [x] Partially Met  [] Not Met   Long term goal 2: Patient  will be Indep. with all recommended HEP's, adaptive strategies, and adaptive techniques. Pt. is performing positioning of L wrist with lifting tasks and performing HEP indep. [x] Met  [] Partially Met  [] Not Met   Long term goal 3: Patient  will increase Left  strength from current by 10 lbs to increase performance with I/ADL's. Pt.'s L  strength=112lbs. On eval=89 lbs.; unzf=951 lbs. [x] Met  [] Partially Met  [] Not Met   Long term goal 4: Patient  will increase Left pinch strength from current by 2-3 lbs to increase performance with I/ADL's. Palmar pinch=19 lbs. (eval=12; norm=21 lbs.)     Lateral pinch=25 lbs. (eval=12; norm=21.5 lbs) [x] Met  [] Partially Met  [] Not Met   Long term goal 5: Patient will be IND with ECWS techniques and improve stability of Left wrist  for ADL. Stability of L wrist improving with therapy. Pt. continues with only occasional pain.  [] Met  [x] Partially Met  [] Not Met   Long term goals 6: Pt. will utilize proper positioning and lifting techniques to perform work tasks without pain. Pt. is using positioning of L wrist with all lifting tasks with only occasional pain. Edema reduced left wrist circ=17.5 cm(eval.= 17.9 cm) [] Met  [x] Partially Met  [] Not Met          TREATMENT PLAN:  [] Evaluate & Treat [] Neuromuscular Re-education   [] Re-evaluation [] Tissue (stress) Loading Program   [x] Pain Management [] PROM/Stretching/AAROM/AROM   [] Edema Management [] Splinting   [] Wound Care/Scar Management [] Desensitization   [] ADL Training [x] Strengthening/Graded Therapeutic Activity   [] Tendon Repair Program [] Coordination/Dexterity Training   [x] Instruction/Application of energy [] Manual Techniques       conservation, work simplification [x] Instruction in HEP       joint protection, body mechanics [] Aquatic Therapy   [x] Modalities: [] Ultrasound   [] Infrared [] Electrical Stimulation [x] Fluidotherapy                          [] Hot/Cold Pack [] Paraffin    [x] Other:Wrist stabilization act. Frequency:  2 days per week  Duration:  4-5 visits     Rehab Potential: [x] Excellent [] Good  [] Fair  [] Poor      Patient Status:    [x] Continue/Initate plan of Care                    []  Discharge OT         []  Additional visits requested, please re-certify for additional visits        Electronically signed by: JAMIL Sanchez 5/11/2021 8:28 AM    If you have any questions or concerns, please don't hesitate to call. Thank you for your referral.      I have reviewed this plan of care and certify a need for medically necessary rehabilitation services.     Physician Signature:__________________________________________________________    Date: 5/11/2021  Please sign and return

## 2021-05-13 ENCOUNTER — OFFICE VISIT (OUTPATIENT)
Dept: ORTHOPEDIC SURGERY | Age: 21
End: 2021-05-13
Payer: COMMERCIAL

## 2021-05-13 ENCOUNTER — HOSPITAL ENCOUNTER (OUTPATIENT)
Dept: OCCUPATIONAL THERAPY | Age: 21
Setting detail: THERAPIES SERIES
Discharge: HOME | End: 2021-05-13
Payer: COMMERCIAL

## 2021-05-13 VITALS
WEIGHT: 165 LBS | OXYGEN SATURATION: 99 % | HEIGHT: 71 IN | HEART RATE: 78 BPM | TEMPERATURE: 95.7 F | BODY MASS INDEX: 23.1 KG/M2

## 2021-05-13 DIAGNOSIS — Z00.00 HEALTH CARE MAINTENANCE: Primary | ICD-10-CM

## 2021-05-13 PROCEDURE — 97530 THERAPEUTIC ACTIVITIES: CPT

## 2021-05-13 PROCEDURE — 99213 OFFICE O/P EST LOW 20 MIN: CPT | Performed by: ORTHOPAEDIC SURGERY

## 2021-05-13 PROCEDURE — 97110 THERAPEUTIC EXERCISES: CPT

## 2021-05-13 NOTE — PROGRESS NOTES
Occupational Therapy  Daily Note     Name: Cam Esparza  : 2000  MRN: [id number]  Diagnosis: L wrist sprain, contusion    Visit Information:   Onset Date: 21  OT Insurance Information: BCBS  Progress Note Counter: 6    Date: 2021  Time:   OT Therapeutic activities 18 minutes for 1 unit(s), CPT 71256  OT Therapeutic exercises 50 minutes for 3 unit(s), CPT 06240       OT Individual Minutes  Time In: 1100  Time Out: 1670  Minutes: 62    Referring Practitioner: Dr. Iza Max    Subjective: \"The doctor said I am good to just keep strengthening until I feel strong, since I haven't had any pain,\" pt. reports. Pain rating:   Pre-treatment pain:    Pt denies pain    Action for pain:   None    Pain after treatment:      Pt denies pain         Focus of treatment was on the following:   stabilizing L wrist for ADL. Objective:    Treatment Activity:     Pt. performed each activity 2 sets x 10 for L wrist stability:  Grasp and pull blue power web  Fist and push into blue power web  Twist and bend tension bar. 1.5 lb. hammer for sup/pronation motion  Wrist roll ups with dowel bar and 2 lb. weight suspended from band. UE walk outs while prone over large therapy ball, weight bearing through [address]. Body blade oscillating motions with LUE only. Education/HEP: body mechanics: with lifting. Discussed previous HEP: yes, Pt verbally confirmed compliant with HEP's Pt demo understanding    Comments:     Assessment:   Pt tolerated treatment well. No pain reported, however pt. reports fatigue in L wrist.  Pt. demonstrates good understanding of positioning for safety left wrist.    Plan:   Continue POC x 1 more week. Goals:     Long term goals  Long term goal 1: Patient will report pain 1-2 or less during functional activities  Long term goal 2: Patient  will be Indep. with all recommended HEP's, adaptive strategies, and adaptive techniques.   Long term goal 3: Patient  will increase Left  strength from current by 10 lbs to increase performance with I/ADL's. Long term goal 4: Patient  will increase Left pinch strength from current by 2-3 lbs to increase performance with I/ADL's. Long term goal 5: Patient will be IND with ECWS techniques and improve stability of Left wrist  for ADL. Long term goals 6: Pt. will utilize proper positioning and lifting techniques to perform work tasks without pain.     Vika Singleton OTR/L   5/13/2021  11:58 AM

## 2021-05-13 NOTE — PROGRESS NOTES
Subjective:      Patient ID: Conner Galaviz is a 24 y.o. male who presents today for:  Chief Complaint   Patient presents with    Follow-up     4 wk f/u left wrist sprain, pt states his wrist is feeling fine, no pain       HPI  Patient comes in for evaluation of his left wrist.  Doing very well at the present time. No complains of any pain. History reviewed. No pertinent past medical history. History reviewed. No pertinent surgical history. Social History     Socioeconomic History    Marital status: Single     Spouse name: Not on file    Number of children: Not on file    Years of education: Not on file    Highest education level: Not on file   Occupational History    Not on file   Social Needs    Financial resource strain: Not on file    Food insecurity     Worry: Not on file     Inability: Not on file    Transportation needs     Medical: Not on file     Non-medical: Not on file   Tobacco Use    Smoking status: Never Smoker    Smokeless tobacco: Never Used   Substance and Sexual Activity    Alcohol use: Never     Frequency: Never    Drug use: Never    Sexual activity: Not on file   Lifestyle    Physical activity     Days per week: Not on file     Minutes per session: Not on file    Stress: Not on file   Relationships    Social connections     Talks on phone: Not on file     Gets together: Not on file     Attends Judaism service: Not on file     Active member of club or organization: Not on file     Attends meetings of clubs or organizations: Not on file     Relationship status: Not on file    Intimate partner violence     Fear of current or ex partner: Not on file     Emotionally abused: Not on file     Physically abused: Not on file     Forced sexual activity: Not on file   Other Topics Concern    Not on file   Social History Narrative    Not on file     History reviewed. No pertinent family history.   No Known Allergies  Current Outpatient Medications on File Prior to Visit   Medication Sig Dispense Refill    meloxicam (MOBIC) 15 MG tablet Take 1 tablet by mouth daily as needed for Pain (Patient not taking: Reported on 4/14/2021) 90 tablet 1     No current facility-administered medications on file prior to visit. Review of Systems  Denies pain left wrist.  Denies any numbness and tingle left upper extremity. No radiation of any pain. Objective:   Pulse 78   Temp (!) 95.7 °F (35.4 °C) (Temporal)   Ht 5' 11\" (1.803 m)   Wt 165 lb (74.8 kg)   SpO2 99%   BMI 23.01 kg/m²     Ortho Exam  Is not oriented male on the no significant distress full range of motion MCP, PIP, DIP of all digits of the left hand. Full flexion-extension of the wrist.  Negative Finkelstein's test.  Full pronation supination of forearm without any pain. No point tenderness on the carpal, metacarpal, distal radius and ulna. Radiographs and Laboratory Studies:     Diagnostic Imaging Studies:    As per prior visits x-rays and MRI were reviewed. Laboratory Studies:   No results found for: WBC, HGB, HCT, MCV, PLT  No results found for: SEDRATE  No results found for: CRP    Assessment:      Diagnosis Orders   1. Health care maintenance  1800 Venu Bowman MD, Internal Medicine, [facility] 855: At this time we will go ahead and slowly get back regular activities. We discussed activities to do and activities to avoid. Continue working on strengthening. We will see him back if he develops any further pains. Orders Placed This Encounter   Procedures   1800 Venu Bowman MD, Internal Medicine, [place]     Referral Priority:   Routine     Referral Type:   Eval and Treat     Referral Reason:   Specialty Services Required     Referred to Provider:   Davin Leonard MD     Requested Specialty:   Internal Medicine     Number of Visits Requested:   1     No orders of the defined types were placed in this encounter. No follow-ups on file.       Raymundo Kirkpatrick MD

## 2021-05-17 ENCOUNTER — HOSPITAL ENCOUNTER (OUTPATIENT)
Dept: OCCUPATIONAL THERAPY | Age: 21
Setting detail: THERAPIES SERIES
Discharge: HOME | End: 2021-05-17
Payer: COMMERCIAL

## 2021-05-17 PROCEDURE — 97530 THERAPEUTIC ACTIVITIES: CPT

## 2021-05-17 PROCEDURE — 97110 THERAPEUTIC EXERCISES: CPT

## 2021-05-17 NOTE — PROGRESS NOTES
Assessment:   Pt tolerated treatment well. Pt. performed all activities without wrist pain and demonstrating functional strength throughout LUE. Pt's  strength significantly improved LUE. Goals met. Plan:   D/C from OP OT. Goals met. Pt. to continue with HEP for continued strength of wrist.    Goals:     Long term goals  Long term goal 1: Patient will report pain 1-2 or less during functional activities  Long term goal 2: Patient  will be Indep. with all recommended HEP's, adaptive strategies, and adaptive techniques. Long term goal 3: Patient  will increase Left  strength from current by 10 lbs to increase performance with I/ADL's. Long term goal 4: Patient  will increase Left pinch strength from current by 2-3 lbs to increase performance with I/ADL's. Long term goal 5: Patient will be IND with ECWS techniques and improve stability of Left wrist  for ADL. Long term goals 6: Pt. will utilize proper positioning and lifting techniques to perform work tasks without pain.     JUSTINE De La Cruz/L   5/17/2021  3:06 PM

## 2021-05-20 ENCOUNTER — APPOINTMENT (OUTPATIENT)
Dept: OCCUPATIONAL THERAPY | Age: 21
End: 2021-05-20
Payer: COMMERCIAL

## 2021-05-24 ENCOUNTER — APPOINTMENT (OUTPATIENT)
Dept: OCCUPATIONAL THERAPY | Age: 21
End: 2021-05-24
Payer: COMMERCIAL

## 2021-05-27 ENCOUNTER — APPOINTMENT (OUTPATIENT)
Dept: OCCUPATIONAL THERAPY | Age: 21
End: 2021-05-27
Payer: COMMERCIAL

## 2023-05-22 ENCOUNTER — OFFICE VISIT (OUTPATIENT)
Dept: PRIMARY CARE CLINIC | Age: 23
End: 2023-05-22
Payer: COMMERCIAL

## 2023-05-22 VITALS
DIASTOLIC BLOOD PRESSURE: 70 MMHG | TEMPERATURE: 98.2 F | OXYGEN SATURATION: 97 % | HEIGHT: 69 IN | WEIGHT: 190 LBS | SYSTOLIC BLOOD PRESSURE: 122 MMHG | HEART RATE: 85 BPM | BODY MASS INDEX: 28.14 KG/M2

## 2023-05-22 DIAGNOSIS — Z00.00 ROUTINE ADULT HEALTH MAINTENANCE: Primary | ICD-10-CM

## 2023-05-22 DIAGNOSIS — Z11.59 NEED FOR HEPATITIS C SCREENING TEST: ICD-10-CM

## 2023-05-22 DIAGNOSIS — Z11.4 SCREENING FOR HIV WITHOUT PRESENCE OF RISK FACTORS: ICD-10-CM

## 2023-05-22 DIAGNOSIS — R10.31 RIGHT INGUINAL PAIN: ICD-10-CM

## 2023-05-22 PROCEDURE — 99385 PREV VISIT NEW AGE 18-39: CPT | Performed by: STUDENT IN AN ORGANIZED HEALTH CARE EDUCATION/TRAINING PROGRAM

## 2023-05-22 SDOH — ECONOMIC STABILITY: INCOME INSECURITY: HOW HARD IS IT FOR YOU TO PAY FOR THE VERY BASICS LIKE FOOD, HOUSING, MEDICAL CARE, AND HEATING?: NOT VERY HARD

## 2023-05-22 SDOH — ECONOMIC STABILITY: FOOD INSECURITY: WITHIN THE PAST 12 MONTHS, YOU WORRIED THAT YOUR FOOD WOULD RUN OUT BEFORE YOU GOT MONEY TO BUY MORE.: NEVER TRUE

## 2023-05-22 SDOH — ECONOMIC STABILITY: FOOD INSECURITY: WITHIN THE PAST 12 MONTHS, THE FOOD YOU BOUGHT JUST DIDN'T LAST AND YOU DIDN'T HAVE MONEY TO GET MORE.: NEVER TRUE

## 2023-05-22 SDOH — ECONOMIC STABILITY: HOUSING INSECURITY
IN THE LAST 12 MONTHS, WAS THERE A TIME WHEN YOU DID NOT HAVE A STEADY PLACE TO SLEEP OR SLEPT IN A SHELTER (INCLUDING NOW)?: NO

## 2023-05-22 ASSESSMENT — PATIENT HEALTH QUESTIONNAIRE - PHQ9
2. FEELING DOWN, DEPRESSED OR HOPELESS: 0
SUM OF ALL RESPONSES TO PHQ9 QUESTIONS 1 & 2: 0
SUM OF ALL RESPONSES TO PHQ QUESTIONS 1-9: 0
1. LITTLE INTEREST OR PLEASURE IN DOING THINGS: 0

## 2023-05-22 NOTE — PATIENT INSTRUCTIONS
Please have your ultrasound scheduled and completed and I will let you know the results. Have labwork done, fast for 10 hours beforehand, you may have black coffee or water only. Our clinic will contact you when results are made available.

## 2023-05-22 NOTE — PROGRESS NOTES
5/22/2023        Kodi Courtney 2000 is a 25 y.o. male who presents today with:  Chief Complaint   Patient presents with    Establish Care     Patient here today to establish care        HPI:   Ena Ivory presents today to establish care. No significant PMHx. Does not take any meds. Due for updated labs. She states she has felt a mass above his groin bulge on and off for the past year however he has noticed it more so in the past month. He states it is slight pain. He does have lifting. He states he does have testicular pain as well. The pain is mainly on the right side. Tdap due 8/2024    Assessment & Plan   1. Routine adult health maintenance  -     Hemoglobin A1C; Future  -     Comprehensive Metabolic Panel; Future  -     CBC with Auto Differential; Future  -     TSH with Reflex; Future  -     Lipid, Fasting; Future  2. Screening for HIV without presence of risk factors  -     HIV Screen; Future  3. Need for hepatitis C screening test  -     Hepatitis C Antibody; Future  4. Right inguinal pain  -     US PELVIS COMPLETE; Future     There are no discontinued medications. No follow-ups on file. Pelvic ultrasound, if no diagnosis, CT scan next    Objective  No Known Allergies  Current Outpatient Medications   Medication Sig Dispense Refill    meloxicam (MOBIC) 15 MG tablet Take 1 tablet by mouth daily as needed for Pain (Patient not taking: Reported on 4/14/2021) 90 tablet 1     No current facility-administered medications for this visit. PMH, Surgical Hx, Family Hx, and Social Hx reviewed and updated. Health Maintenance reviewed.     Vitals:    05/22/23 1532   BP: 122/70   Site: Right Upper Arm   Position: Sitting   Cuff Size: Large Adult   Pulse: 85   Temp: 98.2 °F (36.8 °C)   TempSrc: Temporal   SpO2: 97%   Weight: 190 lb (86.2 kg)   Height: 5' 9\" (1.753 m)     BP Readings from Last 3 Encounters:   05/22/23 122/70   03/06/21 119/70     Wt Readings from Last 3 Encounters:   05/22/23 190 lb (86.2

## 2023-06-01 ENCOUNTER — HOSPITAL ENCOUNTER (OUTPATIENT)
Dept: ULTRASOUND IMAGING | Age: 23
Discharge: HOME | End: 2023-06-03
Payer: COMMERCIAL

## 2023-06-01 DIAGNOSIS — R10.31 RIGHT INGUINAL PAIN: ICD-10-CM

## 2023-06-01 PROCEDURE — 76857 US EXAM PELVIC LIMITED: CPT

## 2023-06-02 DIAGNOSIS — R10.31 RIGHT INGUINAL PAIN: Primary | ICD-10-CM

## 2023-06-20 DIAGNOSIS — R19.00 ABDOMINAL MASS, UNSPECIFIED ABDOMINAL LOCATION: Primary | ICD-10-CM

## 2023-06-20 DIAGNOSIS — R10.31 RIGHT INGUINAL PAIN: ICD-10-CM

## 2023-07-07 ENCOUNTER — OFFICE VISIT (OUTPATIENT)
Dept: SURGERY | Age: 23
End: 2023-07-07
Payer: COMMERCIAL

## 2023-07-07 VITALS
HEIGHT: 69 IN | WEIGHT: 190 LBS | TEMPERATURE: 98.4 F | OXYGEN SATURATION: 98 % | BODY MASS INDEX: 28.14 KG/M2 | HEART RATE: 81 BPM

## 2023-07-07 DIAGNOSIS — K40.91 UNILATERAL RECURRENT INGUINAL HERNIA WITHOUT OBSTRUCTION OR GANGRENE: Primary | ICD-10-CM

## 2023-07-07 PROBLEM — K40.90 INGUINAL HERNIA: Status: ACTIVE | Noted: 2023-07-07

## 2023-07-07 PROCEDURE — 99203 OFFICE O/P NEW LOW 30 MIN: CPT | Performed by: COLON & RECTAL SURGERY

## 2023-07-07 RX ORDER — SODIUM CHLORIDE 0.9 % (FLUSH) 0.9 %
5-40 SYRINGE (ML) INJECTION EVERY 12 HOURS SCHEDULED
OUTPATIENT
Start: 2023-07-07

## 2023-07-07 RX ORDER — SODIUM CHLORIDE 9 MG/ML
INJECTION, SOLUTION INTRAVENOUS PRN
OUTPATIENT
Start: 2023-07-07

## 2023-07-07 RX ORDER — SODIUM CHLORIDE 0.9 % (FLUSH) 0.9 %
5-40 SYRINGE (ML) INJECTION PRN
OUTPATIENT
Start: 2023-07-07

## 2023-07-07 ASSESSMENT — ENCOUNTER SYMPTOMS
COLOR CHANGE: 0
SHORTNESS OF BREATH: 0
DIARRHEA: 0
CONSTIPATION: 0
CHEST TIGHTNESS: 0
ABDOMINAL PAIN: 0

## 2023-08-02 ENCOUNTER — ANESTHESIA EVENT (OUTPATIENT)
Dept: OPERATING ROOM | Age: 23
End: 2023-08-02
Payer: COMMERCIAL

## 2023-08-03 ENCOUNTER — ANESTHESIA (OUTPATIENT)
Dept: OPERATING ROOM | Age: 23
End: 2023-08-03
Payer: COMMERCIAL

## 2023-08-03 ENCOUNTER — HOSPITAL ENCOUNTER (OUTPATIENT)
Age: 23
Setting detail: OUTPATIENT SURGERY
Discharge: HOME | End: 2023-08-03
Attending: COLON & RECTAL SURGERY | Admitting: COLON & RECTAL SURGERY
Payer: COMMERCIAL

## 2023-08-03 VITALS
HEART RATE: 73 BPM | WEIGHT: 190 LBS | RESPIRATION RATE: 16 BRPM | TEMPERATURE: 96.8 F | BODY MASS INDEX: 28.14 KG/M2 | OXYGEN SATURATION: 99 % | DIASTOLIC BLOOD PRESSURE: 73 MMHG | SYSTOLIC BLOOD PRESSURE: 129 MMHG | HEIGHT: 69 IN

## 2023-08-03 DIAGNOSIS — K40.90 NON-RECURRENT UNILATERAL INGUINAL HERNIA WITHOUT OBSTRUCTION OR GANGRENE: ICD-10-CM

## 2023-08-03 DIAGNOSIS — K40.91 UNILATERAL RECURRENT INGUINAL HERNIA WITHOUT OBSTRUCTION OR GANGRENE: Primary | ICD-10-CM

## 2023-08-03 PROCEDURE — 2580000003 HC RX 258: Performed by: COLON & RECTAL SURGERY

## 2023-08-03 PROCEDURE — 7100000011 HC PHASE II RECOVERY - ADDTL 15 MIN: Performed by: COLON & RECTAL SURGERY

## 2023-08-03 PROCEDURE — 49505 PRP I/HERN INIT REDUC >5 YR: CPT | Performed by: COLON & RECTAL SURGERY

## 2023-08-03 PROCEDURE — 3700000001 HC ADD 15 MINUTES (ANESTHESIA): Performed by: COLON & RECTAL SURGERY

## 2023-08-03 PROCEDURE — 6360000002 HC RX W HCPCS: Performed by: STUDENT IN AN ORGANIZED HEALTH CARE EDUCATION/TRAINING PROGRAM

## 2023-08-03 PROCEDURE — 64486 TAP BLOCK UNIL BY INJECTION: CPT | Performed by: STUDENT IN AN ORGANIZED HEALTH CARE EDUCATION/TRAINING PROGRAM

## 2023-08-03 PROCEDURE — 6370000000 HC RX 637 (ALT 250 FOR IP): Performed by: COLON & RECTAL SURGERY

## 2023-08-03 PROCEDURE — 6370000000 HC RX 637 (ALT 250 FOR IP)

## 2023-08-03 PROCEDURE — 3600000014 HC SURGERY LEVEL 4 ADDTL 15MIN: Performed by: COLON & RECTAL SURGERY

## 2023-08-03 PROCEDURE — 6360000002 HC RX W HCPCS

## 2023-08-03 PROCEDURE — 7100000000 HC PACU RECOVERY - FIRST 15 MIN: Performed by: COLON & RECTAL SURGERY

## 2023-08-03 PROCEDURE — 2709999900 HC NON-CHARGEABLE SUPPLY: Performed by: COLON & RECTAL SURGERY

## 2023-08-03 PROCEDURE — C1781 MESH (IMPLANTABLE): HCPCS | Performed by: COLON & RECTAL SURGERY

## 2023-08-03 PROCEDURE — 6360000002 HC RX W HCPCS: Performed by: COLON & RECTAL SURGERY

## 2023-08-03 PROCEDURE — 3700000000 HC ANESTHESIA ATTENDED CARE: Performed by: COLON & RECTAL SURGERY

## 2023-08-03 PROCEDURE — 3600000004 HC SURGERY LEVEL 4 BASE: Performed by: COLON & RECTAL SURGERY

## 2023-08-03 PROCEDURE — 6370000000 HC RX 637 (ALT 250 FOR IP): Performed by: STUDENT IN AN ORGANIZED HEALTH CARE EDUCATION/TRAINING PROGRAM

## 2023-08-03 PROCEDURE — 7100000001 HC PACU RECOVERY - ADDTL 15 MIN: Performed by: COLON & RECTAL SURGERY

## 2023-08-03 PROCEDURE — A4217 STERILE WATER/SALINE, 500 ML: HCPCS | Performed by: COLON & RECTAL SURGERY

## 2023-08-03 PROCEDURE — 7100000010 HC PHASE II RECOVERY - FIRST 15 MIN: Performed by: COLON & RECTAL SURGERY

## 2023-08-03 DEVICE — MESH HERN W1.3XL1.55IN M POLYPR INGUINAL NONABSORBABLE: Type: IMPLANTABLE DEVICE | Site: INGUINAL | Status: FUNCTIONAL

## 2023-08-03 RX ORDER — SODIUM CHLORIDE 9 MG/ML
INJECTION, SOLUTION INTRAVENOUS PRN
Status: DISCONTINUED | OUTPATIENT
Start: 2023-08-03 | End: 2023-08-03 | Stop reason: HOSPADM

## 2023-08-03 RX ORDER — CEFAZOLIN SODIUM 1 G/3ML
INJECTION, POWDER, FOR SOLUTION INTRAMUSCULAR; INTRAVENOUS PRN
Status: DISCONTINUED | OUTPATIENT
Start: 2023-08-03 | End: 2023-08-03 | Stop reason: HOSPADM

## 2023-08-03 RX ORDER — FENTANYL CITRATE 0.05 MG/ML
25 INJECTION, SOLUTION INTRAMUSCULAR; INTRAVENOUS EVERY 5 MIN PRN
Status: DISCONTINUED | OUTPATIENT
Start: 2023-08-03 | End: 2023-08-03 | Stop reason: HOSPADM

## 2023-08-03 RX ORDER — OXYCODONE HYDROCHLORIDE 5 MG/1
5 TABLET ORAL PRN
Status: COMPLETED | OUTPATIENT
Start: 2023-08-03 | End: 2023-08-03

## 2023-08-03 RX ORDER — OXYCODONE HYDROCHLORIDE 5 MG/1
10 TABLET ORAL PRN
Status: COMPLETED | OUTPATIENT
Start: 2023-08-03 | End: 2023-08-03

## 2023-08-03 RX ORDER — LABETALOL HYDROCHLORIDE 5 MG/ML
10 INJECTION, SOLUTION INTRAVENOUS
Status: DISCONTINUED | OUTPATIENT
Start: 2023-08-03 | End: 2023-08-03 | Stop reason: HOSPADM

## 2023-08-03 RX ORDER — SODIUM CHLORIDE 0.9 % (FLUSH) 0.9 %
5-40 SYRINGE (ML) INJECTION EVERY 12 HOURS SCHEDULED
Status: DISCONTINUED | OUTPATIENT
Start: 2023-08-03 | End: 2023-08-03 | Stop reason: HOSPADM

## 2023-08-03 RX ORDER — PROPOFOL 10 MG/ML
INJECTION, EMULSION INTRAVENOUS PRN
Status: DISCONTINUED | OUTPATIENT
Start: 2023-08-03 | End: 2023-08-03 | Stop reason: SDUPTHER

## 2023-08-03 RX ORDER — LIDOCAINE HYDROCHLORIDE 20 MG/ML
INJECTION, SOLUTION INTRAVENOUS PRN
Status: DISCONTINUED | OUTPATIENT
Start: 2023-08-03 | End: 2023-08-03 | Stop reason: SDUPTHER

## 2023-08-03 RX ORDER — DEXAMETHASONE SODIUM PHOSPHATE 4 MG/ML
INJECTION, SOLUTION INTRA-ARTICULAR; INTRALESIONAL; INTRAMUSCULAR; INTRAVENOUS; SOFT TISSUE PRN
Status: DISCONTINUED | OUTPATIENT
Start: 2023-08-03 | End: 2023-08-03 | Stop reason: SDUPTHER

## 2023-08-03 RX ORDER — MAGNESIUM HYDROXIDE 1200 MG/15ML
LIQUID ORAL CONTINUOUS PRN
Status: DISCONTINUED | OUTPATIENT
Start: 2023-08-03 | End: 2023-08-03 | Stop reason: HOSPADM

## 2023-08-03 RX ORDER — PROCHLORPERAZINE EDISYLATE 5 MG/ML
5 INJECTION INTRAMUSCULAR; INTRAVENOUS
Status: DISCONTINUED | OUTPATIENT
Start: 2023-08-03 | End: 2023-08-03 | Stop reason: HOSPADM

## 2023-08-03 RX ORDER — MEPERIDINE HYDROCHLORIDE 25 MG/ML
12.5 INJECTION INTRAMUSCULAR; INTRAVENOUS; SUBCUTANEOUS EVERY 5 MIN PRN
Status: DISCONTINUED | OUTPATIENT
Start: 2023-08-03 | End: 2023-08-03 | Stop reason: HOSPADM

## 2023-08-03 RX ORDER — SODIUM CHLORIDE 0.9 % (FLUSH) 0.9 %
5-40 SYRINGE (ML) INJECTION PRN
Status: DISCONTINUED | OUTPATIENT
Start: 2023-08-03 | End: 2023-08-03 | Stop reason: HOSPADM

## 2023-08-03 RX ORDER — OXYCODONE HYDROCHLORIDE AND ACETAMINOPHEN 5; 325 MG/1; MG/1
1 TABLET ORAL EVERY 6 HOURS PRN
Qty: 12 TABLET | Refills: 0 | Status: SHIPPED | OUTPATIENT
Start: 2023-08-03 | End: 2023-08-06

## 2023-08-03 RX ORDER — FENTANYL CITRATE 50 UG/ML
INJECTION, SOLUTION INTRAMUSCULAR; INTRAVENOUS PRN
Status: DISCONTINUED | OUTPATIENT
Start: 2023-08-03 | End: 2023-08-03 | Stop reason: SDUPTHER

## 2023-08-03 RX ORDER — KETOROLAC TROMETHAMINE 30 MG/ML
INJECTION, SOLUTION INTRAMUSCULAR; INTRAVENOUS PRN
Status: DISCONTINUED | OUTPATIENT
Start: 2023-08-03 | End: 2023-08-03 | Stop reason: SDUPTHER

## 2023-08-03 RX ORDER — SODIUM CHLORIDE, SODIUM LACTATE, POTASSIUM CHLORIDE, CALCIUM CHLORIDE 600; 310; 30; 20 MG/100ML; MG/100ML; MG/100ML; MG/100ML
INJECTION, SOLUTION INTRAVENOUS CONTINUOUS
Status: DISCONTINUED | OUTPATIENT
Start: 2023-08-03 | End: 2023-08-03 | Stop reason: HOSPADM

## 2023-08-03 RX ORDER — MIDAZOLAM HYDROCHLORIDE 1 MG/ML
INJECTION INTRAMUSCULAR; INTRAVENOUS PRN
Status: DISCONTINUED | OUTPATIENT
Start: 2023-08-03 | End: 2023-08-03 | Stop reason: SDUPTHER

## 2023-08-03 RX ORDER — DIPHENHYDRAMINE HYDROCHLORIDE 50 MG/ML
12.5 INJECTION INTRAMUSCULAR; INTRAVENOUS
Status: DISCONTINUED | OUTPATIENT
Start: 2023-08-03 | End: 2023-08-03 | Stop reason: HOSPADM

## 2023-08-03 RX ORDER — FENTANYL CITRATE 50 UG/ML
INJECTION, SOLUTION INTRAMUSCULAR; INTRAVENOUS
Status: DISCONTINUED
Start: 2023-08-03 | End: 2023-08-03 | Stop reason: HOSPADM

## 2023-08-03 RX ORDER — WOUND DRESSING ADHESIVE - LIQUID
LIQUID MISCELLANEOUS PRN
Status: DISCONTINUED | OUTPATIENT
Start: 2023-08-03 | End: 2023-08-03 | Stop reason: HOSPADM

## 2023-08-03 RX ORDER — HYDRALAZINE HYDROCHLORIDE 20 MG/ML
10 INJECTION INTRAMUSCULAR; INTRAVENOUS
Status: DISCONTINUED | OUTPATIENT
Start: 2023-08-03 | End: 2023-08-03 | Stop reason: HOSPADM

## 2023-08-03 RX ORDER — FENTANYL CITRATE 0.05 MG/ML
50 INJECTION, SOLUTION INTRAMUSCULAR; INTRAVENOUS EVERY 5 MIN PRN
Status: DISCONTINUED | OUTPATIENT
Start: 2023-08-03 | End: 2023-08-03 | Stop reason: HOSPADM

## 2023-08-03 RX ORDER — BUPIVACAINE HYDROCHLORIDE 5 MG/ML
INJECTION, SOLUTION EPIDURAL; INTRACAUDAL
Status: COMPLETED | OUTPATIENT
Start: 2023-08-03 | End: 2023-08-03

## 2023-08-03 RX ORDER — ONDANSETRON 2 MG/ML
4 INJECTION INTRAMUSCULAR; INTRAVENOUS
Status: DISCONTINUED | OUTPATIENT
Start: 2023-08-03 | End: 2023-08-03 | Stop reason: HOSPADM

## 2023-08-03 RX ORDER — ONDANSETRON 2 MG/ML
INJECTION INTRAMUSCULAR; INTRAVENOUS PRN
Status: DISCONTINUED | OUTPATIENT
Start: 2023-08-03 | End: 2023-08-03 | Stop reason: SDUPTHER

## 2023-08-03 RX ADMIN — FENTANYL CITRATE 25 MCG: 50 INJECTION, SOLUTION INTRAMUSCULAR; INTRAVENOUS at 10:02

## 2023-08-03 RX ADMIN — FENTANYL CITRATE 25 MCG: 50 INJECTION, SOLUTION INTRAMUSCULAR; INTRAVENOUS at 10:13

## 2023-08-03 RX ADMIN — FENTANYL CITRATE 25 MCG: 50 INJECTION, SOLUTION INTRAMUSCULAR; INTRAVENOUS at 10:18

## 2023-08-03 RX ADMIN — FENTANYL CITRATE 50 MCG: 50 INJECTION INTRAMUSCULAR; INTRAVENOUS at 11:24

## 2023-08-03 RX ADMIN — SODIUM CHLORIDE 1000 ML: 9 INJECTION, SOLUTION INTRAVENOUS at 09:11

## 2023-08-03 RX ADMIN — PROPOFOL 200 MG: 10 INJECTION, EMULSION INTRAVENOUS at 09:54

## 2023-08-03 RX ADMIN — ONDANSETRON 4 MG: 2 INJECTION INTRAMUSCULAR; INTRAVENOUS at 10:39

## 2023-08-03 RX ADMIN — OXYCODONE HYDROCHLORIDE 5 MG: 5 TABLET ORAL at 11:58

## 2023-08-03 RX ADMIN — MIDAZOLAM HYDROCHLORIDE 2 MG: 1 INJECTION, SOLUTION INTRAMUSCULAR; INTRAVENOUS at 09:50

## 2023-08-03 RX ADMIN — DEXAMETHASONE SODIUM PHOSPHATE 8 MG: 4 INJECTION, SOLUTION INTRAMUSCULAR; INTRAVENOUS at 09:58

## 2023-08-03 RX ADMIN — LIDOCAINE HYDROCHLORIDE 40 MG: 20 INJECTION, SOLUTION INTRAVENOUS at 09:54

## 2023-08-03 RX ADMIN — KETOROLAC TROMETHAMINE 30 MG: 30 INJECTION, SOLUTION INTRAMUSCULAR; INTRAVENOUS at 10:39

## 2023-08-03 RX ADMIN — CEFAZOLIN 2000 MG: 2 INJECTION, POWDER, FOR SOLUTION INTRAMUSCULAR; INTRAVENOUS at 09:57

## 2023-08-03 RX ADMIN — FENTANYL CITRATE 25 MCG: 50 INJECTION, SOLUTION INTRAMUSCULAR; INTRAVENOUS at 10:24

## 2023-08-03 RX ADMIN — FENTANYL CITRATE 50 MCG: 50 INJECTION INTRAMUSCULAR; INTRAVENOUS at 11:30

## 2023-08-03 RX ADMIN — BUPIVACAINE HYDROCHLORIDE 30 ML: 5 INJECTION, SOLUTION EPIDURAL; INTRACAUDAL at 09:59

## 2023-08-03 ASSESSMENT — PAIN DESCRIPTION - DESCRIPTORS
DESCRIPTORS: BURNING
DESCRIPTORS: BURNING

## 2023-08-03 ASSESSMENT — PAIN DESCRIPTION - LOCATION
LOCATION: ABDOMEN
LOCATION: ABDOMEN

## 2023-08-03 ASSESSMENT — PAIN SCALES - GENERAL
PAINLEVEL_OUTOF10: 5
PAINLEVEL_OUTOF10: 4

## 2023-08-03 ASSESSMENT — PAIN - FUNCTIONAL ASSESSMENT: PAIN_FUNCTIONAL_ASSESSMENT: 0-10

## 2023-08-03 NOTE — DISCHARGE INSTRUCTIONS
Dressing x48 hours    Steri-Strips until office    May shower 48 hours    No driving while on pain medication    No lifting greater than 10 pounds for the next 2 weeks    May take stairs

## 2023-08-03 NOTE — PROGRESS NOTES
Called Patient at [phone], he states he was told to be here at 0830 am, not 0800 am as on schedule.

## 2023-08-03 NOTE — ANESTHESIA POSTPROCEDURE EVALUATION
Department of Anesthesiology  Postprocedure Note    Patient: Claudio Simental  MRN: [id number]  [facility] 2000  Date of evaluation: 8/3/2023      Procedure Summary     Date: 08/03/23 Room / Location: [address]    Anesthesia Start: 6960 Anesthesia Stop: 0739    Procedure: Right inguinal hernia repair with mesh (Right: Abdomen) Diagnosis:       Inguinal hernia      (Inguinal hernia [K40.90])    Surgeons: Triny Villarreal MD Responsible Provider: Octavia Conteh MD    Anesthesia Type: general ASA Status: 1          Anesthesia Type: No value filed.     Dave Phase I: Dave Score: 10    Dave Phase II:        Anesthesia Post Evaluation    Patient location during evaluation: PACU  Patient participation: waiting for patient participation  Level of consciousness: sleepy but conscious  Airway patency: patent  Nausea & Vomiting: no nausea and no vomiting  Complications: no  Cardiovascular status: blood pressure returned to baseline and hemodynamically stable  Respiratory status: acceptable  Hydration status: euvolemic  Pain management: adequate

## 2023-08-03 NOTE — ANESTHESIA PROCEDURE NOTES
Peripheral Block    Patient location during procedure: OR  Reason for block: post-op pain management and at surgeon's request  Start time: 8/3/2023 9:55 AM  End time: 8/3/2023 9:59 AM  Staffing  Performed: anesthesiologist   Anesthesiologist: Ivan Pretty MD  Preanesthetic Checklist  Completed: patient identified, IV checked, site marked, risks and benefits discussed, surgical/procedural consents, equipment checked, pre-op evaluation, timeout performed, anesthesia consent given, oxygen available and monitors applied/VS acknowledged  Peripheral Block   Patient position: supine  Prep: ChloraPrep  Provider prep: mask and sterile gloves (Sterile probe cover)  Patient monitoring: cardiac monitor, continuous pulse ox, frequent blood pressure checks and IV access  Block type: TAP  Laterality: right  Injection technique: single-shot  Guidance: nerve stimulator and ultrasound guided  Local infiltration: bupivacaine  Infiltration strength: 0.5 %  Local infiltration: bupivacaine  Dose: 30 mL    Needle   Needle type: combined needle/nerve stimulator   Needle gauge: 22 G  Needle localization: anatomical landmarks and ultrasound guidance  Needle length: 10 cm  Assessment   Injection assessment: negative aspiration for heme, no paresthesia on injection and local visualized surrounding nerve on ultrasound  Paresthesia pain: immediately resolved  Slow fractionated injection: yes  Hemodynamics: stable  Real-time US image taken/store: yes  Outcomes: uncomplicated    Additional Notes  Ultrasound image printed and saved in patient chart.     Sterile probe cover used    Medications Administered  bupivacaine (MARCAINE) PF injection 0.5% - Perineural   30 mL - 8/3/2023 9:59:00 AM

## 2023-08-03 NOTE — BRIEF OP NOTE
Brief Postoperative Note      Patient: Sheree Stanton  YOB: 2000  MRN: [id number]    Date of Procedure: 8/3/2023    Pre-Op Diagnosis Codes:     * Inguinal hernia [K40.90], right    Post-Op Diagnosis: Same       Procedure(s):  Right inguinal hernia repair with mesh    Surgeon(s):  Sonny Carey MD    Assistant:  First Assistant: Andre Falcon    Anesthesia: General, right tap block    Estimated Blood Loss (mL): 25    Complications: None    Specimens:   * No specimens in log *    Implants:  Implant Name Type Inv. Item Serial No.  Lot No. LRB No. Used Action   MESH IRASEMA W1.3XL1. 55IN M POLYPR INGUINAL NONABSORBABLE - [id number]  MESH IRASEMA W1.3XL1. 55IN M POLYPR INGUINAL NONABSORBABLE  BARD DAVOL-WD [id number] Right 1 Implanted         Drains: * No LDAs found *    Findings: Indirect right inguinal hernia repaired with high ligation and medium mesh plug      Electronically signed by Sonny Carey MD on 8/3/2023 at 10:43 AM

## 2023-08-03 NOTE — OP NOTE
[facility], [address] [facility]                                OPERATIVE REPORT    PATIENT NAME: Angela Estevez                        :        2000  MED REC NO:   [id number]                            ROOM:  ACCOUNT NO:   [de-identified]                           ADMIT DATE: 2023  PROVIDER:     Luis Chávez MD    DATE OF PROCEDURE:  2023    PREOPERATIVE DIAGNOSIS:  Right inguinal hernia. POSTOPERATIVE DIAGNOSIS:  Indirect right inguinal hernia. PROCEDURE PERFORMED:  Right inguinal hernia repair with mesh. SURGEON:  Luis Chávez MD    ASSISTANT:  Ms. Fanny Davis. ANESTHESIA:  1. General anesthesia. 2.  Right TAP block. ESTIMATED BLOOD LOSS:  25 mL. SPECIMENS:  None. COMPLICATIONS:  None. INDICATIONS:  A 81-year-old male with a symptomatic right inguinal  hernia. I saw him in the office and discussed the risks and benefits of  right inguinal hernia repair with mesh. Risks of the procedure  including infection, bleeding, damage to the spermatic cord, recurrence,  reoperation, postoperative pain, and mesh complications were all  addressed. Despite the risks, he wished to proceed. Consent obtained. OPERATIVE PROCEDURE:  He was taken to the operating room and placed in  the supine position. General anesthesia was administered. He had a  right TAP block placed. Scrotum and abdomen were prepped and draped  with a Betadine-containing solution and ChloraPrep-containing solution  respectively. He received Ancef. A time-out was taken for appropriate  verification and verification of laterality. An incision was made parallel, but superior to the inguinal ligament. Subcutaneous tissues were incised through [place]'s to the external  oblique, which was opened under direct visualization. The spermatic  cord was circumferentiated with a Penrose drain.   A indirect sac was  teased off the spermatic cord down to the internal ring in the anterior  medial portion of the cord. The sac was suture ligated in a high  fashion with a 0 Vicryl suture. The sac was amputated and discarded. A medium mesh plug was placed to reinforce the transversalis fascia. It  was sutured in several locations using interrupted 0 Vicryl and 0  Prolene suture. It was sutured loosely to the internal oblique as well  as the periosteum of the pubic tubercle and shelving edge of the  inguinal ligament. Upon completion, the mesh repair was completely intact and under no  tension. Excellent hemostasis was assured. Ancef-containing solution  was used for irrigation. The external oblique was approximated using a running 2-0 Vicryl suture. Chico's fascia was closed using a running 3-0 Vicryl suture. The skin  was closed with 4-0 Monocryl in a subcuticular fashion. Prior to and after mesh implantation; the lap, needle, and instrument  counts were all correct. Dressings were applied. The scrotum was palpated and both testicles  were present under no tension. The patient was extubated and taken to Recovery for postop monitoring.         Kojo Santizo MD    D: 08/03/2023 10:49:36       T: 08/03/2023 10:52:09     [email or web address]  Job#: [id number]     Doc#: [id number]    CC:

## 2023-08-03 NOTE — ANESTHESIA PRE PROCEDURE
Postoperative opioids intended and Prophylactic antiemetics administered. Anesthetic plan and risks discussed with patient. Plan discussed with CRNA.     Attending anesthesiologist reviewed and agrees with Pre Eval content      Post-op pain plan if not by surgeon: jeremy Jurado MD   8/3/2023 Face Mask

## 2023-08-03 NOTE — PROGRESS NOTES
Reviewed discharge instructions with Mom present, verbalized understanding and all questions answered.

## 2023-08-11 ENCOUNTER — OFFICE VISIT (OUTPATIENT)
Dept: SURGERY | Age: 23
End: 2023-08-11

## 2023-08-11 VITALS
HEART RATE: 85 BPM | OXYGEN SATURATION: 98 % | WEIGHT: 190 LBS | BODY MASS INDEX: 28.14 KG/M2 | TEMPERATURE: 97.7 F | HEIGHT: 69 IN

## 2023-08-11 DIAGNOSIS — K40.90 UNILATERAL INGUINAL HERNIA WITHOUT OBSTRUCTION OR GANGRENE, RECURRENCE NOT SPECIFIED: Primary | ICD-10-CM

## 2023-08-11 PROCEDURE — 99024 POSTOP FOLLOW-UP VISIT: CPT | Performed by: COLON & RECTAL SURGERY

## 2023-08-11 NOTE — PROGRESS NOTES
Subjective:      Patient ID: Ewa Hughes is a 25 y.o. male who presents for:  Chief Complaint   Patient presents with    Post-Op Check       He returns to the office after a right inguinal hernia repair with mesh. He is doing well. Denies pain. Eating without problems. Bowels working. No past medical history on file. Past Surgical History:   Procedure Laterality Date    ANKLE FRACTURE SURGERY Right     HERNIA REPAIR Right 8/3/2023    Right inguinal hernia repair with mesh performed by Tre Yan MD at [address] [place] [address] History     Socioeconomic History    Marital status: Single     Spouse name: Not on file    Number of children: Not on file    Years of education: Not on file    Highest education level: Not on file   Occupational History    Not on file   Tobacco Use    Smoking status: Never     Passive exposure: Never    Smokeless tobacco: Never   Vaping Use    Vaping Use: Never used   Substance and Sexual Activity    Alcohol use: Yes     Comment: occ    Drug use: Never    Sexual activity: Not on file   Other Topics Concern    Not on file   Social History Narrative    Not on file     Social Determinants of Health     Financial Resource Strain: Low Risk     Difficulty of Paying Living Expenses: Not very hard   Food Insecurity: No Food Insecurity    Worried About Running Out of Food in the Last Year: Never true    [address] in the Last Year: Never true   Transportation Needs: Unknown    Lack of Transportation (Medical): Not on file    Lack of Transportation (Non-Medical): No   Physical Activity: Not on file   Stress: Not on file   Social Connections: Not on file   Intimate Partner Violence: Not on file   Housing Stability: Unknown    Unable to Pay for Housing in the Last Year: Not on file    Number of Places Lived in the Last Year: Not on file    Unstable Housing in the Last Year: No     No family history on file. Allergies:  Patient has no known allergies.     Review of

## 2023-11-22 ENCOUNTER — OFFICE VISIT (OUTPATIENT)
Dept: PRIMARY CARE CLINIC | Age: 23
End: 2023-11-22
Payer: COMMERCIAL

## 2023-11-22 VITALS
OXYGEN SATURATION: 98 % | WEIGHT: 192 LBS | TEMPERATURE: 97.4 F | SYSTOLIC BLOOD PRESSURE: 128 MMHG | HEART RATE: 87 BPM | BODY MASS INDEX: 28.35 KG/M2 | DIASTOLIC BLOOD PRESSURE: 70 MMHG

## 2023-11-22 DIAGNOSIS — K40.91 UNILATERAL RECURRENT INGUINAL HERNIA WITHOUT OBSTRUCTION OR GANGRENE: ICD-10-CM

## 2023-11-22 DIAGNOSIS — Z00.00 ROUTINE ADULT HEALTH MAINTENANCE: Primary | ICD-10-CM

## 2023-11-22 PROCEDURE — 99213 OFFICE O/P EST LOW 20 MIN: CPT | Performed by: STUDENT IN AN ORGANIZED HEALTH CARE EDUCATION/TRAINING PROGRAM

## 2024-05-31 ENCOUNTER — OFFICE VISIT (OUTPATIENT)
Dept: PRIMARY CARE CLINIC | Age: 24
End: 2024-05-31
Payer: COMMERCIAL

## 2024-05-31 VITALS
DIASTOLIC BLOOD PRESSURE: 68 MMHG | BODY MASS INDEX: 28.61 KG/M2 | OXYGEN SATURATION: 99 % | HEIGHT: 69 IN | SYSTOLIC BLOOD PRESSURE: 110 MMHG | HEART RATE: 87 BPM | WEIGHT: 193.2 LBS

## 2024-05-31 DIAGNOSIS — R10.31 GROIN PAIN, RIGHT: Primary | ICD-10-CM

## 2024-05-31 PROCEDURE — 99213 OFFICE O/P EST LOW 20 MIN: CPT | Performed by: STUDENT IN AN ORGANIZED HEALTH CARE EDUCATION/TRAINING PROGRAM

## 2024-05-31 SDOH — ECONOMIC STABILITY: FOOD INSECURITY: WITHIN THE PAST 12 MONTHS, THE FOOD YOU BOUGHT JUST DIDN'T LAST AND YOU DIDN'T HAVE MONEY TO GET MORE.: NEVER TRUE

## 2024-05-31 SDOH — ECONOMIC STABILITY: INCOME INSECURITY: HOW HARD IS IT FOR YOU TO PAY FOR THE VERY BASICS LIKE FOOD, HOUSING, MEDICAL CARE, AND HEATING?: NOT HARD AT ALL

## 2024-05-31 SDOH — ECONOMIC STABILITY: FOOD INSECURITY: WITHIN THE PAST 12 MONTHS, YOU WORRIED THAT YOUR FOOD WOULD RUN OUT BEFORE YOU GOT MONEY TO BUY MORE.: NEVER TRUE

## 2024-05-31 ASSESSMENT — PATIENT HEALTH QUESTIONNAIRE - PHQ9
1. LITTLE INTEREST OR PLEASURE IN DOING THINGS: NOT AT ALL
SUM OF ALL RESPONSES TO PHQ QUESTIONS 1-9: 0
2. FEELING DOWN, DEPRESSED OR HOPELESS: NOT AT ALL
SUM OF ALL RESPONSES TO PHQ9 QUESTIONS 1 & 2: 0
SUM OF ALL RESPONSES TO PHQ QUESTIONS 1-9: 0

## 2024-05-31 NOTE — PROGRESS NOTES
5/31/2024        Wellington Lombardi 2000 is a 23 y.o. male who presents today with:  Chief Complaint   Patient presents with    Groin Pain     2 weeks started and Wed it got more intense not sure if he pulled something at baseball       HPI:   Patient presents due to groin pain for the past 2 weeks.  He states that it has gotten worse over the past 3 days.  He states that he is not sure if he pulled something or not.  He has a history of inguinal hernia repair.    Assessment & Plan   1. Groin pain, right     There are no discontinued medications.  No follow-ups on file.    Advised there is no sign of hernia and to let me know if symptoms persist as he may need a CT scan    Objective  No Known Allergies  Current Outpatient Medications   Medication Sig Dispense Refill    Omega-3 Fatty Acids (FISH OIL PO) Take 1 tablet by mouth 2-3 times a week (Patient not taking: Reported on 5/31/2024)       No current facility-administered medications for this visit.       PMH, Surgical Hx, Family Hx, and Social Hx reviewed and updated.  Health Maintenance reviewed.    Vitals:    05/31/24 1025   BP: 110/68   Site: Left Upper Arm   Position: Sitting   Cuff Size: Medium Adult   Pulse: 87   SpO2: 99%   Weight: 87.6 kg (193 lb 3.2 oz)   Height: 1.753 m (5' 9\")     BP Readings from Last 3 Encounters:   05/31/24 110/68   11/22/23 128/70   08/03/23 129/73     Wt Readings from Last 3 Encounters:   05/31/24 87.6 kg (193 lb 3.2 oz)   11/22/23 87.1 kg (192 lb)   08/11/23 86.2 kg (190 lb)       No results found for: \"LABA1C\"  No results found for: \"CREATININE\"  No results found for: \"ALT\", \"AST\"  No results found for: \"CHOL\", \"TRIG\", \"HDL\", \"LDLDIRECT\"     Review of Systems   Physical Exam  Vitals reviewed.   Constitutional:       General: He is not in acute distress.     Appearance: Normal appearance. He is not ill-appearing.   HENT:      Right Ear: External ear normal.      Left Ear: External ear normal.      Mouth/Throat:      Mouth: Mucous

## 2024-07-22 ENCOUNTER — TELEPHONE (OUTPATIENT)
Dept: PRIMARY CARE CLINIC | Age: 24
End: 2024-07-22

## 2024-07-22 DIAGNOSIS — Z13.220 SCREENING, LIPID: ICD-10-CM

## 2024-07-22 DIAGNOSIS — Z13.1 SCREENING FOR DIABETES MELLITUS: Primary | ICD-10-CM

## 2024-07-22 DIAGNOSIS — Z11.59 NEED FOR HEPATITIS C SCREENING TEST: ICD-10-CM

## 2024-07-22 DIAGNOSIS — Z13.1 SCREENING FOR DIABETES MELLITUS: ICD-10-CM

## 2024-07-22 DIAGNOSIS — Z11.4 SCREENING FOR HIV (HUMAN IMMUNODEFICIENCY VIRUS): ICD-10-CM

## 2024-07-22 DIAGNOSIS — Z13.29 THYROID DISORDER SCREEN: ICD-10-CM

## 2024-07-22 DIAGNOSIS — Z00.00 ROUTINE ADULT HEALTH MAINTENANCE: ICD-10-CM

## 2024-07-22 LAB
ALBUMIN SERPL-MCNC: 4.8 G/DL (ref 3.5–4.6)
ALP SERPL-CCNC: 73 U/L (ref 35–104)
ALT SERPL-CCNC: 34 U/L (ref 0–41)
ANION GAP SERPL CALCULATED.3IONS-SCNC: 13 MEQ/L (ref 9–15)
AST SERPL-CCNC: 24 U/L (ref 0–40)
BASOPHILS # BLD: 0 K/UL (ref 0–0.2)
BASOPHILS NFR BLD: 0.4 %
BILIRUB SERPL-MCNC: 0.8 MG/DL (ref 0.2–0.7)
BUN SERPL-MCNC: 15 MG/DL (ref 6–20)
CALCIUM SERPL-MCNC: 9.9 MG/DL (ref 8.5–9.9)
CHLORIDE SERPL-SCNC: 102 MEQ/L (ref 95–107)
CHOLEST SERPL-MCNC: 229 MG/DL (ref 0–199)
CO2 SERPL-SCNC: 25 MEQ/L (ref 20–31)
CREAT SERPL-MCNC: 1.11 MG/DL (ref 0.7–1.2)
EOSINOPHIL # BLD: 0.4 K/UL (ref 0–0.7)
EOSINOPHIL NFR BLD: 5.1 %
ERYTHROCYTE [DISTWIDTH] IN BLOOD BY AUTOMATED COUNT: 12 % (ref 11.5–14.5)
ESTIMATED AVERAGE GLUCOSE: 105 MG/DL
GLOBULIN SER CALC-MCNC: 3.1 G/DL (ref 2.3–3.5)
GLUCOSE SERPL-MCNC: 88 MG/DL (ref 70–99)
HBA1C MFR BLD: 5.3 % (ref 4–6)
HCT VFR BLD AUTO: 45.6 % (ref 42–52)
HDLC SERPL-MCNC: 63 MG/DL (ref 40–59)
HGB BLD-MCNC: 16 G/DL (ref 14–18)
LDL CHOLESTEROL: 144 MG/DL (ref 0–129)
LYMPHOCYTES # BLD: 2 K/UL (ref 1–4.8)
LYMPHOCYTES NFR BLD: 27.4 %
MCH RBC QN AUTO: 31 PG (ref 27–31.3)
MCHC RBC AUTO-ENTMCNC: 35.1 % (ref 33–37)
MCV RBC AUTO: 88.4 FL (ref 79–92.2)
MONOCYTES # BLD: 0.6 K/UL (ref 0.2–0.8)
MONOCYTES NFR BLD: 7.9 %
NEUTROPHILS # BLD: 4.2 K/UL (ref 1.4–6.5)
NEUTS SEG NFR BLD: 58.9 %
PLATELET # BLD AUTO: 265 K/UL (ref 130–400)
POTASSIUM SERPL-SCNC: 4.1 MEQ/L (ref 3.4–4.9)
PROT SERPL-MCNC: 7.9 G/DL (ref 6.3–8)
RBC # BLD AUTO: 5.16 M/UL (ref 4.7–6.1)
SODIUM SERPL-SCNC: 140 MEQ/L (ref 135–144)
TRIGLYCERIDE, FASTING: 108 MG/DL (ref 0–150)
TSH REFLEX: 1.53 UIU/ML (ref 0.44–3.86)
WBC # BLD AUTO: 7.1 K/UL (ref 4.8–10.8)

## 2024-07-22 NOTE — TELEPHONE ENCOUNTER
Pt in to have labwork and the orders in the system , he is in the lobby and would like to have done, please renew ALL the orders from 2023 that are  so he can have done today.

## 2024-07-23 LAB
HEPATITIS C ANTIBODY: NONREACTIVE
HIV AG/AB: NONREACTIVE

## (undated) DEVICE — COUNTER NDL 40 COUNT HLD 70 FOAM BLK ADH W/ MAG

## (undated) DEVICE — PACK,LAPAROTOMY,NO GOWNS: Brand: MEDLINE

## (undated) DEVICE — YANKAUER,BULB TIP,W/O VENT,RIGID,STERILE: Brand: MEDLINE

## (undated) DEVICE — SPONGE,LAP,18"X18",DLX,XR,ST,5/PK,40/PK: Brand: MEDLINE

## (undated) DEVICE — GLOVE ORANGE PI 7 1/2   MSG9075

## (undated) DEVICE — ELECTRODE PT RET AD L9FT HI MOIST COND ADH HYDRGEL CORDED

## (undated) DEVICE — SUTURE PROL SZ 0 L30IN NONABSORBABLE BLU L36MM CT-1 1/2 CIR 8424H

## (undated) DEVICE — SKIN PREP TRAY 4 COMPARTM TRAY: Brand: MEDLINE INDUSTRIES, INC.

## (undated) DEVICE — COVER LT HNDL BLU PLAS

## (undated) DEVICE — SUTURE VCRL SZ 3-0 L27IN ABSRB UD L26MM SH 1/2 CIR J416H

## (undated) DEVICE — TOWEL,OR,DSP,ST,BLUE,STD,4/PK,20PK/CS: Brand: MEDLINE

## (undated) DEVICE — NEPTUNE E-SEP SMOKE EVACUATION PENCIL, COATED, 70MM BLADE, PUSH BUTTON SWITCH: Brand: NEPTUNE E-SEP

## (undated) DEVICE — SYRINGE IRRIG 60ML SFT PLIABLE BLB EZ TO GRP 1 HND USE W/

## (undated) DEVICE — TUBING, SUCTION, 9/32" X 12', STRAIGHT: Brand: MEDLINE INDUSTRIES, INC.

## (undated) DEVICE — DRAIN SURG PENROSE 0.25X12 IN CLOSED WND DRAINAGE PREM SIL

## (undated) DEVICE — SUTURE VCRL SZ 2-0 L27IN ABSRB UD L26MM SH 1/2 CIR J417H

## (undated) DEVICE — GAUZE,SPONGE,FLUFF,6"X6.75",STRL,10/TRAY: Brand: MEDLINE

## (undated) DEVICE — MARKER SURG SKIN GENTIAN VLT REG TIP W/ 6IN RUL DYNJSM01

## (undated) DEVICE — SINGLE PORT MANIFOLD: Brand: NEPTUNE 2

## (undated) DEVICE — GOWN,AURORA,NONRNF,XL,30/CS: Brand: MEDLINE

## (undated) DEVICE — SUTURE ABSORBABLE BRAIDED 0 CT-1 8X27 IN UD VICRYL JJ41G

## (undated) DEVICE — LABEL MED MINI W/ MARKER

## (undated) DEVICE — GOWN,AURORA,NONREINFORCED,LARGE: Brand: MEDLINE

## (undated) DEVICE — APPLICATOR MEDICATED 26 CC SOLUTION HI LT ORNG CHLORAPREP